# Patient Record
Sex: FEMALE | NOT HISPANIC OR LATINO | URBAN - METROPOLITAN AREA
[De-identification: names, ages, dates, MRNs, and addresses within clinical notes are randomized per-mention and may not be internally consistent; named-entity substitution may affect disease eponyms.]

---

## 2018-01-21 ENCOUNTER — EMERGENCY (EMERGENCY)
Facility: HOSPITAL | Age: 23
LOS: 1 days | Discharge: HOME | End: 2018-01-21
Attending: EMERGENCY MEDICINE | Admitting: EMERGENCY MEDICINE
Payer: COMMERCIAL

## 2018-01-21 VITALS
DIASTOLIC BLOOD PRESSURE: 87 MMHG | HEART RATE: 99 BPM | OXYGEN SATURATION: 100 % | RESPIRATION RATE: 16 BRPM | SYSTOLIC BLOOD PRESSURE: 127 MMHG | TEMPERATURE: 98 F

## 2018-01-21 PROCEDURE — 99283 EMERGENCY DEPT VISIT LOW MDM: CPT

## 2018-01-21 PROCEDURE — 71046 X-RAY EXAM CHEST 2 VIEWS: CPT | Mod: 26

## 2018-01-21 RX ORDER — ALBUTEROL 90 UG/1
2 AEROSOL, METERED ORAL
Qty: 0.5 | Refills: 0 | OUTPATIENT
Start: 2018-01-21 | End: 2018-02-19

## 2018-01-21 RX ORDER — ALBUTEROL 90 UG/1
2.5 AEROSOL, METERED ORAL ONCE
Qty: 0 | Refills: 0 | Status: COMPLETED | OUTPATIENT
Start: 2018-01-21 | End: 2018-01-21

## 2018-01-21 RX ADMIN — ALBUTEROL 2.5 MILLIGRAM(S): 90 AEROSOL, METERED ORAL at 23:35

## 2018-01-21 NOTE — ED PROVIDER NOTE - CONSTITUTIONAL, MLM
normal... Well appearing, well nourished, awake, alert, oriented to person, place, time/situation and in no apparent distress. pt is comfortable, and speaking in full sentences.

## 2018-01-21 NOTE — ED PROVIDER NOTE - MEDICAL DECISION MAKING DETAILS
21 y/o F p/w intermittent SOB x2days w/ associated nasal congestion, +sick contacts at home, concerning for viral syndrome, pt is perc negative. 21 y/o F p/w intermittent SOB x2days w/ associated nasal congestion, +sick contacts at home, concerning for viral syndrome, pt is perc negative.  -xr, supportive tx

## 2018-01-21 NOTE — ED ADULT NURSE NOTE - OBJECTIVE STATEMENT
pt returned to intake 13 ambulating back to room from xray. respirations even and unlabored. breath sounds present bilaterally clear, good aeration no audible wheezing. pt c.o. chest tightness and sob. neb given as ordered.

## 2018-01-21 NOTE — ED ADULT TRIAGE NOTE - CHIEF COMPLAINT QUOTE
c.o shortness of breath and dizziness since friday. denies chest pain. able to speak full sentences in triage. denies pmh

## 2018-01-21 NOTE — ED PROVIDER NOTE - OBJECTIVE STATEMENT
23 y/o F w/ no PMHx p/w SOB. Notes feeling intermittent dyspnea x2days, lasting minutes to hrs at a time. Random onset. Associated w/ nasal congestion. Pt's parents are sick at home w/ URI-like sx. Pt denies fever, cough, sore throat, CP, recent travel, OCP use, h/o DVT. 23 y/o F w/ no PMHx p/w SOB. Notes feeling intermittent dyspnea x2days, lasting minutes to hrs at a time. Random onset. Associated w/ nasal congestion. Pt's parents are sick at home w/ URI-like sx. Pt denies fever, cough, sore throat, CP, recent travel, OCP use, leg swelling, h/o DVT.

## 2018-01-21 NOTE — ED PROVIDER NOTE - ATTENDING CONTRIBUTION TO CARE
23 y/o F with hx seasonal allergies and anxiety here for intermittent sob x 2 days a/w nasal congestion, +sick contacts at home, concerning for viral syndrome, pt is perc negative. No f/c, cough, cp, palpitations, n/v, abdominal pain, leg swelling. Pt just had her IUD removed. Denies recent travel, recent surgery.  -xr, nebs for symptomatic relief and d/c if improved and chest xray is neg for acute process  I performed a history and physical exam of the patient and discussed their management with the resident. I reviewed the resident's note and agree with the documented findings and plan of care. My medical decision making and observations are found above.

## 2019-02-16 ENCOUNTER — EMERGENCY (EMERGENCY)
Facility: HOSPITAL | Age: 24
LOS: 1 days | Discharge: HOME | End: 2019-02-16
Attending: EMERGENCY MEDICINE | Admitting: EMERGENCY MEDICINE
Payer: COMMERCIAL

## 2019-02-16 VITALS
OXYGEN SATURATION: 98 % | SYSTOLIC BLOOD PRESSURE: 106 MMHG | HEART RATE: 63 BPM | DIASTOLIC BLOOD PRESSURE: 67 MMHG | TEMPERATURE: 98 F | RESPIRATION RATE: 18 BRPM

## 2019-02-16 PROCEDURE — 99283 EMERGENCY DEPT VISIT LOW MDM: CPT

## 2019-02-16 NOTE — PROGRESS NOTE ADULT - SUBJECTIVE AND OBJECTIVE BOX
HPI:  Pt presents with pain to head and swelling after extraction of left side upper and lower wisdom teeth. Pt had extractions completed 10 days ago and has completed antibiotic regimen.       Allergies    No Known Allergies      Vital Signs Last 24 Hrs  T(C): 36.6 (16 Feb 2019 19:10), Max: 36.6 (16 Feb 2019 19:10)  T(F): 97.9 (16 Feb 2019 19:10), Max: 97.9 (16 Feb 2019 19:10)  HR: 63 (16 Feb 2019 19:10) (63 - 63)  BP: 106/67 (16 Feb 2019 19:10) (106/67 - 106/67)  BP(mean): --  RR: 18 (16 Feb 2019 19:10) (18 - 18)  SpO2: 98% (16 Feb 2019 19:10) (98% - 98%)    CLINICAL EXAM: EOE: No LAD, TMD, swelling, asymetry   IOE: No oral lesions found on lateral border of tongue, soft/hard palate, uvula. Mild purulent swelling around site of tooth #17. Pus appreciated. Socket appears to be closed.     DENTAL RADIOGRAPHS: PAN taken and reviewed.     RADIOLOGY & ADDITIONAL TESTS: Site of tooth #16 and #17 extractions appear normal.     ASSESSMENT: 23 year old presents with mild abscess in site of tooth #17 post extraction.   PLAN: Incision and drainage.     PROCEDURE:  Verbal and written consent given.  Anesthesia: 2 carpules of 2 % Lidocaine 1: 100K epi administered as left RADHA block. 1 carpule of 4 % Septocaine administered as buccal infiltration.   Procedure: Site incised to bone with 15 blade. Blunt dissection performed. Site copiously irrigated with saline. 1 iodoform gauze placed and sutured with 1 black silk suture. POIG    RECOMMENDATIONS:  1) Follow up with patients outpatient dentist or [facility] in 48 hour for drain removal.   2) Soft diet, OTC pain meds  3) F/U with outpatient dentist for comprehensive dental care upon discharge.  4) If swelling, fever, difficulty breathing/swallowing occurs, page Dental.     Reyes Ball, Pager #[phone]

## 2019-02-16 NOTE — ED PROVIDER NOTE - PROGRESS NOTE DETAILS
Randy att: Patient not seen in ED, not seen by provider x 2, call placed topreferred phone and discussed dc instructions on phone.

## 2019-02-16 NOTE — ED PROVIDER NOTE - CARE PLAN
Prior Authorization fax to [facility]. Patient notified that the Adderall is not considered an emergency drug for refill. He will have to try and fail two approved stimulant medications for 60 days each before the consideration of a non approved stimulant. Patient notified of this information.  
Principal Discharge DX:	Dental abscess

## 2019-02-16 NOTE — ED PROVIDER NOTE - OBJECTIVE STATEMENT
19:52 Randy att: 23F s/p   Amoxicillin 6 days  Percocet 7.5/325 q4h   Ibuprofen     2d More pain in jaw and left ear pain and headache   x fever facial swelling     PMH x PSH tooth extraction MED as above ALL x SMOKE x PCP PHARMACY [phone] 19:52 Randy att: 23F POD#10 2 wisdom tooth extractions p/w incr headache, ear pain and jaw pain x 2 days. Ten days ago patient had upper left and lower left molar removed. Took amoxicillin bid x 6 days as prescribed. Past 2 days notes increased left sided headache, left ear pain, and left jaw pain despite ibuprofen and Percocet 7.5/325 q4h. Denies fever or facial swelling. PMH x PSH tooth extraction MED as above ALL x SMOKE x PCP PHARMACY [facility] [phone] [place] [address] [place]

## 2019-02-16 NOTE — ED PROVIDER NOTE - NSFOLLOWUPINSTRUCTIONS_ED_ALL_ED_FT
Seen in ER for dental abscess after wisdom tooth extraction. [facility] drained the abscess. Please take Clindamycin 300 mg every 8 hours x 7 days to clear the infection. Continue your pain meds as prescribed, the pain will subside now that the area has been drained. Follow-up with your dentist. Return to ER for persistent fever, persistent pain, or increased facial swelling.

## 2019-09-10 ENCOUNTER — EMERGENCY (EMERGENCY)
Facility: HOSPITAL | Age: 24
LOS: 1 days | Discharge: HOME | End: 2019-09-10
Attending: STUDENT IN AN ORGANIZED HEALTH CARE EDUCATION/TRAINING PROGRAM | Admitting: STUDENT IN AN ORGANIZED HEALTH CARE EDUCATION/TRAINING PROGRAM
Payer: COMMERCIAL

## 2019-09-10 VITALS
HEART RATE: 78 BPM | SYSTOLIC BLOOD PRESSURE: 104 MMHG | OXYGEN SATURATION: 99 % | DIASTOLIC BLOOD PRESSURE: 65 MMHG | RESPIRATION RATE: 16 BRPM | TEMPERATURE: 98 F

## 2019-09-10 VITALS
OXYGEN SATURATION: 99 % | DIASTOLIC BLOOD PRESSURE: 76 MMHG | RESPIRATION RATE: 16 BRPM | HEIGHT: 63 IN | TEMPERATURE: 98 F | SYSTOLIC BLOOD PRESSURE: 109 MMHG | WEIGHT: 175.05 LBS | HEART RATE: 82 BPM

## 2019-09-10 LAB
ALBUMIN SERPL ELPH-MCNC: 4.2 G/DL — SIGNIFICANT CHANGE UP (ref 3.3–5)
ALP SERPL-CCNC: 67 U/L — SIGNIFICANT CHANGE UP (ref 40–120)
ALT FLD-CCNC: 12 U/L — SIGNIFICANT CHANGE UP (ref 4–33)
ANION GAP SERPL CALC-SCNC: 13 MMO/L — SIGNIFICANT CHANGE UP (ref 7–14)
APPEARANCE UR: CLEAR — SIGNIFICANT CHANGE UP
APTT BLD: 27 SEC — LOW (ref 27.5–36.3)
AST SERPL-CCNC: 10 U/L — SIGNIFICANT CHANGE UP (ref 4–32)
BACTERIA # UR AUTO: SIGNIFICANT CHANGE UP
BILIRUB SERPL-MCNC: 0.3 MG/DL — SIGNIFICANT CHANGE UP (ref 0.2–1.2)
BILIRUB UR-MCNC: NEGATIVE — SIGNIFICANT CHANGE UP
BLOOD UR QL VISUAL: HIGH
BUN SERPL-MCNC: 9 MG/DL — SIGNIFICANT CHANGE UP (ref 7–23)
CALCIUM SERPL-MCNC: 8.9 MG/DL — SIGNIFICANT CHANGE UP (ref 8.4–10.5)
CHLORIDE SERPL-SCNC: 102 MMOL/L — SIGNIFICANT CHANGE UP (ref 98–107)
CO2 SERPL-SCNC: 23 MMOL/L — SIGNIFICANT CHANGE UP (ref 22–31)
COLOR SPEC: SIGNIFICANT CHANGE UP
CREAT SERPL-MCNC: 0.64 MG/DL — SIGNIFICANT CHANGE UP (ref 0.5–1.3)
GLUCOSE SERPL-MCNC: 95 MG/DL — SIGNIFICANT CHANGE UP (ref 70–99)
GLUCOSE UR-MCNC: NEGATIVE — SIGNIFICANT CHANGE UP
HCG UR-SCNC: NEGATIVE — SIGNIFICANT CHANGE UP
HCT VFR BLD CALC: 36.3 % — SIGNIFICANT CHANGE UP (ref 34.5–45)
HGB BLD-MCNC: 11.7 G/DL — SIGNIFICANT CHANGE UP (ref 11.5–15.5)
HYALINE CASTS # UR AUTO: NEGATIVE — SIGNIFICANT CHANGE UP
INR BLD: 1.12 — SIGNIFICANT CHANGE UP (ref 0.88–1.17)
KETONES UR-MCNC: NEGATIVE — SIGNIFICANT CHANGE UP
LEUKOCYTE ESTERASE UR-ACNC: SIGNIFICANT CHANGE UP
MCHC RBC-ENTMCNC: 28.3 PG — SIGNIFICANT CHANGE UP (ref 27–34)
MCHC RBC-ENTMCNC: 32.2 % — SIGNIFICANT CHANGE UP (ref 32–36)
MCV RBC AUTO: 87.9 FL — SIGNIFICANT CHANGE UP (ref 80–100)
NITRITE UR-MCNC: NEGATIVE — SIGNIFICANT CHANGE UP
NRBC # FLD: 0 K/UL — SIGNIFICANT CHANGE UP (ref 0–0)
PH UR: 7 — SIGNIFICANT CHANGE UP (ref 5–8)
PLATELET # BLD AUTO: 297 K/UL — SIGNIFICANT CHANGE UP (ref 150–400)
PMV BLD: 9.8 FL — SIGNIFICANT CHANGE UP (ref 7–13)
POTASSIUM SERPL-MCNC: 4 MMOL/L — SIGNIFICANT CHANGE UP (ref 3.5–5.3)
POTASSIUM SERPL-SCNC: 4 MMOL/L — SIGNIFICANT CHANGE UP (ref 3.5–5.3)
PROT SERPL-MCNC: 7.6 G/DL — SIGNIFICANT CHANGE UP (ref 6–8.3)
PROT UR-MCNC: 20 — SIGNIFICANT CHANGE UP
PROTHROM AB SERPL-ACNC: 12.5 SEC — SIGNIFICANT CHANGE UP (ref 9.8–13.1)
RBC # BLD: 4.13 M/UL — SIGNIFICANT CHANGE UP (ref 3.8–5.2)
RBC # FLD: 12.9 % — SIGNIFICANT CHANGE UP (ref 10.3–14.5)
RBC CASTS # UR COMP ASSIST: >50 — HIGH (ref 0–?)
SODIUM SERPL-SCNC: 138 MMOL/L — SIGNIFICANT CHANGE UP (ref 135–145)
SP GR SPEC: 1.01 — SIGNIFICANT CHANGE UP (ref 1–1.04)
SQUAMOUS # UR AUTO: SIGNIFICANT CHANGE UP
UROBILINOGEN FLD QL: NORMAL — SIGNIFICANT CHANGE UP
WBC # BLD: 10.94 K/UL — HIGH (ref 3.8–10.5)
WBC # FLD AUTO: 10.94 K/UL — HIGH (ref 3.8–10.5)
WBC UR QL: HIGH (ref 0–?)

## 2019-09-10 PROCEDURE — 76830 TRANSVAGINAL US NON-OB: CPT | Mod: 26

## 2019-09-10 PROCEDURE — 99284 EMERGENCY DEPT VISIT MOD MDM: CPT

## 2019-09-10 RX ORDER — SODIUM CHLORIDE 9 MG/ML
1000 INJECTION INTRAMUSCULAR; INTRAVENOUS; SUBCUTANEOUS ONCE
Refills: 0 | Status: COMPLETED | OUTPATIENT
Start: 2019-09-10 | End: 2019-09-10

## 2019-09-10 RX ADMIN — SODIUM CHLORIDE 1000 MILLILITER(S): 9 INJECTION INTRAMUSCULAR; INTRAVENOUS; SUBCUTANEOUS at 20:18

## 2019-09-10 NOTE — ED PROVIDER NOTE - OBJECTIVE STATEMENT
24 y/o female patient is presenting to the ED c/o abdominal cramping and vaginal bleeding. Patient states her period began on Sunday and has progressively become more painful and heavy with lower abdominal pain. She reports severe pain last night that interfered with her ability to urinate. She was seen by urgent care today and recommended to come into the ED for U/S and bloodwork. She is sexually active without birth control and had an elective miscarriage in the past. She denies any medical problems. She had her annual pelvic last week which was normal. No nausea, vomiting, diarrhea, dysuria, hematuria, fever, chills. 24 y/o female patient is presenting to the ED c/o abdominal cramping and vaginal bleeding. Patient states her period began on  and has progressively become more painful and heavy with lower abdominal pain. She reports severe pain last night that interfered with her ability to urinate. She was seen by urgent care today and recommended to come into the ED for U/S and bloodwork. She is sexually active without birth control and had an elective  w/ methotrexate in the past. She denies any medical problems. She had her annual pelvic last week which was normal. No nausea, vomiting, diarrhea, dysuria, hematuria, fever, chills.

## 2019-09-10 NOTE — ED PROVIDER NOTE - PATIENT PORTAL LINK FT
You can access the FollowMyHealth Patient Portal offered by [facility] by registering at the following website: [email or web address]. By joining [facility]’s FollowMyHealth portal, you will also be able to view your health information using other applications (apps) compatible with our system.

## 2019-09-10 NOTE — ED PROVIDER NOTE - ATTENDING CONTRIBUTION TO CARE
22 yo female presents to ED for evalaution of abd craming and vaginal bleeding. Denies nausea, vomiting. Denies urinary symptoms including dysuria, increased urinary frequency, hematuria. Denies fevers, chills, nausea, vomiting, flank pain.   Gen: no acute distress, well appearing, awake, alert and oriented x 3  Cardiac: regular rate and rhythm, +S1S2  Pulm: Clear to auscultation bilaterally  Abd: soft, +lower abd ttp, nondistended, no guarding  MDM - Will check labs, imaging, medicate, reassess

## 2019-09-10 NOTE — ED PROVIDER NOTE - CLINICAL SUMMARY MEDICAL DECISION MAKING FREE TEXT BOX
24 y/o female patient with no PMH presents with vaginal bleeding and cramping x  2 days. Will order labs and transvaginal U/S r/o ovarian torsion or ruptured cyst.

## 2019-09-10 NOTE — ED ADULT TRIAGE NOTE - CHIEF COMPLAINT QUOTE
Pt c/o abdominal pain has her period now pt went to urgent care and was sent here for further eval   PMH: ryan

## 2019-09-10 NOTE — ED ADULT NURSE NOTE - OBJECTIVE STATEMENT
Received pt in intake 3, ambulatory, pt A&Ox4, respirations even and unlabored b/l. Pt c/o abd pain. Reports is currently on menstrual cycle. Went to Urgent Care and was sent to ED for further evaluation. IVL 20g Angiocath placed on left AC. Labs sent. Urine sent. Awaiting TVUS. Will continue to monitor.

## 2019-09-10 NOTE — ED PROVIDER NOTE - NS ED ROS FT
CONST: no fevers, no chills  EYES: no pain, no vision changes  ENT: no sore throat, no ear pain, no change in hearing  CV: no chest pain, no leg swelling  RESP: no shortness of breath, no cough  ABD: + abdominal pain, no nausea, no vomiting, no diarrhea  : no dysuria, no flank pain, no hematuria  MSK: no back pain, no extremity pain  NEURO: no headache or additional neurologic complaints  SKIN:  no rash

## 2019-09-10 NOTE — ED PROVIDER NOTE - PHYSICAL EXAMINATION
GENERAL: Patient awake alert NAD.  HEENT: NC/AT, Moist mucous membranes, PERRL, EOMI.  LUNGS: CTAB, no wheezes or crackles.   CARDIAC: RRR, no m/r/g.    ABDOMEN: Soft, mild-moderate LLQ tenderness to palpation, ND, No rebound, guarding. No CVA tenderness.   PELVIC: os open with blood, no discharge, right sided adnexal tenderness on palpation, no cervical motion tenderness  NEURO: A&Ox3. Moving all extremities.  SKIN: Warm and dry. No rash.  PSYCH: Normal affect.

## 2021-05-26 NOTE — ED PROVIDER NOTE - CAS EDP CONSULT WILL SEE PT
Colon/egd scheduled 07/292/1 @[facility] with Dr Mohini Dick    Miralax/dulcolax instructions given by Urban Soulier in the office shortly

## 2022-05-06 ENCOUNTER — APPOINTMENT (OUTPATIENT)
Dept: PULMONOLOGY | Facility: CLINIC | Age: 27
End: 2022-05-06
Payer: COMMERCIAL

## 2022-05-06 ENCOUNTER — NON-APPOINTMENT (OUTPATIENT)
Age: 27
End: 2022-05-06

## 2022-05-06 VITALS
BODY MASS INDEX: 31.01 KG/M2 | WEIGHT: 175 LBS | TEMPERATURE: 98.7 F | SYSTOLIC BLOOD PRESSURE: 111 MMHG | OXYGEN SATURATION: 96 % | DIASTOLIC BLOOD PRESSURE: 79 MMHG | HEART RATE: 80 BPM | HEIGHT: 63 IN

## 2022-05-06 DIAGNOSIS — J30.2 OTHER SEASONAL ALLERGIC RHINITIS: ICD-10-CM

## 2022-05-06 DIAGNOSIS — Z80.8 FAMILY HISTORY OF MALIGNANT NEOPLASM OF OTHER ORGANS OR SYSTEMS: ICD-10-CM

## 2022-05-06 DIAGNOSIS — Z82.69 FAMILY HISTORY OF OTHER DISEASES OF THE MUSCULOSKELETAL SYSTEM AND CONNECTIVE TISSUE: ICD-10-CM

## 2022-05-06 DIAGNOSIS — Z78.9 OTHER SPECIFIED HEALTH STATUS: ICD-10-CM

## 2022-05-06 DIAGNOSIS — R06.2 WHEEZING: ICD-10-CM

## 2022-05-06 DIAGNOSIS — Z82.49 FAMILY HISTORY OF ISCHEMIC HEART DISEASE AND OTHER DISEASES OF THE CIRCULATORY SYSTEM: ICD-10-CM

## 2022-05-06 DIAGNOSIS — R05.2 SUBACUTE COUGH: ICD-10-CM

## 2022-05-06 DIAGNOSIS — Z83.438 FAMILY HISTORY OF OTHER DISORDER OF LIPOPROTEIN METABOLISM AND OTHER LIPIDEMIA: ICD-10-CM

## 2022-05-06 PROCEDURE — 94727 GAS DIL/WSHOT DETER LNG VOL: CPT

## 2022-05-06 PROCEDURE — 94729 DIFFUSING CAPACITY: CPT

## 2022-05-06 PROCEDURE — 94060 EVALUATION OF WHEEZING: CPT

## 2022-05-06 PROCEDURE — 99205 OFFICE O/P NEW HI 60 MIN: CPT | Mod: 25

## 2022-05-06 PROCEDURE — 71046 X-RAY EXAM CHEST 2 VIEWS: CPT

## 2022-05-06 PROCEDURE — 94618 PULMONARY STRESS TESTING: CPT

## 2022-05-06 RX ORDER — CETIRIZINE HYDROCHLORIDE 10 MG/1
10 TABLET, FILM COATED ORAL
Qty: 1 | Refills: 0 | Status: ACTIVE | COMMUNITY
Start: 2022-05-06

## 2022-05-06 NOTE — HISTORY OF PRESENT ILLNESS
[TextBox_4] : 26-year-old female\par Chief complaint COVID infection approximately 1 month ago\par Post patient received COVID Pfizer vaccine x2 doses without booster\par Denies prior history of pulmonary disease\par No history of asthma pneumonia bronchitis pulmonary Bolick disease\par Patient states she did have significant childhood allergies and eczema\par Other medical history fibromyalgia\par She is subsequently complaining of shortness of breath\par She has significant nocturnal symptoms even feel like she awakens gasping but does not describe being told of loud snoring\par She is complained of some chest tightness with associated occasional wheeze\par She has no reported active sputum production fevers chills or sweats\par With her initial COVID infection symptoms included at that time fever body aches cough chills sputum production\par She also feels that dairy products might trigger symptomatology\par Shortness of breath has been persistent since COVID\par She did not receive treatment monoclonal antibody\par

## 2022-05-06 NOTE — PHYSICAL EXAM
[No Acute Distress] : no acute distress [Low Lying Soft Palate] : low lying soft palate [III] : Mallampati Class: III [Normal Appearance] : normal appearance [Supple] : supple [No Neck Mass] : no neck mass [No JVD] : no jvd [Normal Rate/Rhythm] : normal rate/rhythm [Normal S1, S2] : normal s1, s2 [No Murmurs] : no murmurs [No Resp Distress] : no resp distress [No Acc Muscle Use] : no acc muscle use [Normal Palpation] : normal palpation [Normal Rhythm and Effort] : normal rhythm and effort [Clear to Auscultation Bilaterally] : clear to auscultation bilaterally [Normal to Percussion] : normal to percussion [No Abnormalities] : no abnormalities [Benign] : benign [Not Tender] : not tender [Soft] : soft [No HSM] : no hsm [Normal Bowel Sounds] : normal bowel sounds [Normal Gait] : normal gait [No Clubbing] : no clubbing [No Cyanosis] : no cyanosis [No Edema] : no edema [FROM] : FROM [Normal Color/ Pigmentation] : normal color/ pigmentation [No Focal Deficits] : no focal deficits [Oriented x3] : oriented x3 [Normal Affect] : normal affect

## 2022-05-06 NOTE — DISCUSSION/SUMMARY
[FreeTextEntry1] : COVID-19 infection\par Cough shortness of breath wheeze\par Small airways disease\par Abnormal PFT\par History childhood eczema\par Fibromyalgia\par Recommendations are\par Patient is seeing an allergist so held off on asthma panel Food IgE and serum IgE at present time\par Treatment protocol\par Short acting bronchodilator albuterol provided with testing done in office\par Asmanex 100 MCG 2 puffs twice daily with instructions to rinse\par MDI detailed instructions with visual instructions provided\par Singular 10 mg 1 tablet p.o. daily with food\par Office follow-up 1 month recheck spirometry and\par Request allergy data for completeness\par

## 2022-05-06 NOTE — REVIEW OF SYSTEMS
[Nasal Congestion] : nasal congestion [Sinus Problems] : sinus problems [Seasonal Allergies] : seasonal allergies [Negative] : Neurologic [Depression] : no depression [Anxiety] : no anxiety [Diabetes] : no diabetes [Thyroid Problem] : no thyroid problem [TextBox_30] : HPI [TextBox_104] : kemar Dowell

## 2022-05-06 NOTE — PROCEDURE
[FreeTextEntry1] : PFT May 6, 2022\par Variable loops\par Will interpret based on postbronchodilator response\par Mild obstructive ventilatory impairment\par Ratio 75 normal lung volumes\par Positive air trapping\par RV/TLC ratio 173% predicted\par Diffusion normal 80% of predicted\par Hemoglobin 12.4\par \par Pulmonary 6-minute walk test  5/6/22\par Baseline room air O2 saturation 98%\par Impression normal study on room air\par No evidence of exercise-induced exertional hypoxemia\par \par Chest x-ray PA lateral May 6, 2022\par Normal cardiac size\par Clear lung fields\par No parenchymal infiltrates pleural effusions dominant pulmonary nodules\par Soft tissue bony structures unremarkable\par No prior chest x-rays for review\par

## 2022-06-10 ENCOUNTER — APPOINTMENT (OUTPATIENT)
Dept: PULMONOLOGY | Facility: CLINIC | Age: 27
End: 2022-06-10
Payer: COMMERCIAL

## 2022-06-10 VITALS
BODY MASS INDEX: 31.01 KG/M2 | OXYGEN SATURATION: 99 % | WEIGHT: 175 LBS | SYSTOLIC BLOOD PRESSURE: 120 MMHG | HEART RATE: 81 BPM | HEIGHT: 63 IN | RESPIRATION RATE: 16 BRPM | DIASTOLIC BLOOD PRESSURE: 84 MMHG | TEMPERATURE: 98.5 F

## 2022-06-10 DIAGNOSIS — R06.02 SHORTNESS OF BREATH: ICD-10-CM

## 2022-06-10 LAB — POCT - HEMOGLOBIN (HGB), QUANTITATIVE, TRANSCUTANEOUS: 12.3

## 2022-06-10 PROCEDURE — 94729 DIFFUSING CAPACITY: CPT

## 2022-06-10 PROCEDURE — 99214 OFFICE O/P EST MOD 30 MIN: CPT | Mod: 25

## 2022-06-10 PROCEDURE — 88738 HGB QUANT TRANSCUTANEOUS: CPT

## 2022-06-10 PROCEDURE — ZZZZZ: CPT

## 2022-06-10 PROCEDURE — 94727 GAS DIL/WSHOT DETER LNG VOL: CPT

## 2022-06-10 PROCEDURE — 94010 BREATHING CAPACITY TEST: CPT

## 2022-06-10 NOTE — HISTORY OF PRESENT ILLNESS
[TextBox_4] : 26-year-old female\par Post evaluation treatment protocol Inhaled corticosteroids Singulair significant overall improvement of respiratory status with resolution of any significant cough chest congestion wheezing chest tightness chest congestion\par Chief complaint COVID infection approximately 1 month ago\par Post patient received COVID Pfizer vaccine x2 doses without booster\par Denies prior history of pulmonary disease\par No history of asthma pneumonia bronchitis pulmonary Bolick disease\par Patient states she did have significant childhood allergies and eczema\par Other medical history fibromyalgia\par She is subsequently complaining of shortness of breath\par She has significant nocturnal symptoms even feel like she awakens gasping but does not describe being told of loud snoring\par She is complained of some chest tightness with associated occasional wheeze\par She has no reported active sputum production fevers chills or sweats\par With her initial COVID infection symptoms included at that time fever body aches cough chills sputum production\par She also feels that dairy products might trigger symptomatology\par Shortness of breath has been persistent since COVID\par She did not receive treatment monoclonal antibody\par

## 2022-06-10 NOTE — DISCUSSION/SUMMARY
[FreeTextEntry1] : COVID-19 infection\par Cough shortness of breath wheeze resolution\par Small airways disease\par Abnormal PFT noted interval improvement at flow rates\par History childhood eczema\par Fibromyalgia\par Recommendations are\par Patient is seeing an allergist so held off on asthma panel Food IgE and serum IgE at present time\par Treatment protocol\par Short acting bronchodilator albuterol provided with testing done in office\par Asmanex 100 MCG 2 puffs twice daily with instructions to rinse continue\par MDI detailed instructions with visual instructions provided\par Singular 10 mg 1 tablet p.o. daily with food continue\par Office follow-up 1 month recheck spirometry \par Request allergy data for completeness\par

## 2022-06-10 NOTE — PROCEDURE
[FreeTextEntry1] : PFT Deysi 10, 2022\par Flow rates normal\par FEV1 88% predicted\par Lung vitals are normal\par Total capacity 91% of predicted\par Diffusion normal range 75% of predicted\par Hemoglobin 12.3\par Data comparison to May 6, 2022 demonstrates some decline at the lung capacities but most significant interval improvement at the FVC from 2.40 to 3.39 L\par FEV1 improved from 1.20 to 2.83 L consistent with interval improvement of the underlying small airways disease post COVID-19 infection\par \par PFT May 6, 2022\par Variable loops\par Will interpret based on postbronchodilator response\par Mild obstructive ventilatory impairment\par Ratio 75 normal lung volumes\par Positive air trapping\par RV/TLC ratio 173% predicted\par Diffusion normal 80% of predicted\par Hemoglobin 12.4\par \par Pulmonary 6-minute walk test  5/6/22\par Baseline room air O2 saturation 98%\par Impression normal study on room air\par No evidence of exercise-induced exertional hypoxemia\par \par Chest x-ray PA lateral May 6, 2022\par Normal cardiac size\par Clear lung fields\par No parenchymal infiltrates pleural effusions dominant pulmonary nodules\par Soft tissue bony structures unremarkable\par No prior chest x-rays for review\par

## 2022-06-10 NOTE — REVIEW OF SYSTEMS
[Nasal Congestion] : nasal congestion [Sinus Problems] : sinus problems [Seasonal Allergies] : seasonal allergies [Depression] : no depression [Anxiety] : no anxiety [Diabetes] : no diabetes [Thyroid Problem] : no thyroid problem [Negative] : Neurologic [TextBox_30] : HPI [TextBox_104] : kemar Dowell

## 2022-07-08 ENCOUNTER — APPOINTMENT (OUTPATIENT)
Dept: PULMONOLOGY | Facility: CLINIC | Age: 27
End: 2022-07-08

## 2022-07-08 VITALS
WEIGHT: 169 LBS | BODY MASS INDEX: 29.95 KG/M2 | HEIGHT: 63 IN | HEART RATE: 75 BPM | TEMPERATURE: 98.4 F | DIASTOLIC BLOOD PRESSURE: 72 MMHG | SYSTOLIC BLOOD PRESSURE: 103 MMHG | OXYGEN SATURATION: 96 %

## 2022-07-08 DIAGNOSIS — U07.1 COVID-19: ICD-10-CM

## 2022-07-08 DIAGNOSIS — R94.2 ABNORMAL RESULTS OF PULMONARY FUNCTION STUDIES: ICD-10-CM

## 2022-07-08 DIAGNOSIS — J98.4 OTHER DISORDERS OF LUNG: ICD-10-CM

## 2022-07-08 PROCEDURE — 94060 EVALUATION OF WHEEZING: CPT

## 2022-07-08 PROCEDURE — 99214 OFFICE O/P EST MOD 30 MIN: CPT | Mod: 25

## 2022-07-08 NOTE — REASON FOR VISIT
[Cough] : cough [Follow-Up] : a follow-up visit [TextBox_44] : KHOA RUSH, COVID 19 Infection, Small Airways Disease

## 2022-07-08 NOTE — DISCUSSION/SUMMARY
[FreeTextEntry1] : COVID-19 infection\par Cough shortness of breath wheeze resolution\par Small airways disease\par Abnormal PFT noted interval improvement at flow rates\par stable pulmonary physiology\par History childhood eczema\par Fibromyalgia\par Recommendations are\par Patient is seeing an allergist so held off on asthma panel Food IgE and serum IgE at present time\par Treatment protocol\par Short acting bronchodilator albuterol provided with testing done in office\par Asmanex 100 MCG 2 puffs twice daily with instructions to rinse continue\par MDI detailed instructions with visual instructions provided\par Singular 10 mg 1 tablet p.o. daily with food continue\par Office follow-up 1 month recheck spirometry \par Request allergy data for completeness\par

## 2022-07-08 NOTE — HISTORY OF PRESENT ILLNESS
[TextBox_4] : 26-year-old female\par post singulair asmanex with minimal use zyrtec and pos clinical benefit\par Post evaluation treatment protocol Inhaled corticosteroids Singulair significant overall improvement of respiratory status with resolution of any significant cough chest congestion wheezing chest tightness chest congestion\par Chief complaint COVID infection approximately 1 month ago\par Post patient received COVID Pfizer vaccine x2 doses without booster\par Denies prior history of pulmonary disease\par No history of asthma pneumonia bronchitis pulmonary Bolick disease\par Patient states she did have significant childhood allergies and eczema\par Other medical history fibromyalgia\par She is subsequently complaining of shortness of breath\par She has significant nocturnal symptoms even feel like she awakens gasping but does not describe being told of loud snoring\par She is complained of some chest tightness with associated occasional wheeze\par She has no reported active sputum production fevers chills or sweats\par With her initial COVID infection symptoms included at that time fever body aches cough chills sputum production\par She also feels that dairy products might trigger symptomatology\par Shortness of breath has been persistent since COVID\par She did not receive treatment monoclonal antibody\par

## 2022-07-08 NOTE — PROCEDURE
[FreeTextEntry1] : BALBIR 7/8/22\par  nl flows  stable \par \par PFT Deysi 10, 2022\par Flow rates normal\par FEV1 88% predicted\par Lung vitals are normal\par Total capacity 91% of predicted\par Diffusion normal range 75% of predicted\par Hemoglobin 12.3\par Data comparison to May 6, 2022 demonstrates some decline at the lung capacities but most significant interval improvement at the FVC from 2.40 to 3.39 L\par FEV1 improved from 1.20 to 2.83 L consistent with interval improvement of the underlying small airways disease post COVID-19 infection\par \par PFT May 6, 2022\par Variable loops\par Will interpret based on postbronchodilator response\par Mild obstructive ventilatory impairment\par Ratio 75 normal lung volumes\par Positive air trapping\par RV/TLC ratio 173% predicted\par Diffusion normal 80% of predicted\par Hemoglobin 12.4\par \par Pulmonary 6-minute walk test  5/6/22\par Baseline room air O2 saturation 98%\par Impression normal study on room air\par No evidence of exercise-induced exertional hypoxemia\par \par Chest x-ray PA lateral May 6, 2022\par Normal cardiac size\par Clear lung fields\par No parenchymal infiltrates pleural effusions dominant pulmonary nodules\par Soft tissue bony structures unremarkable\par No prior chest x-rays for review\par

## 2022-10-07 ENCOUNTER — APPOINTMENT (OUTPATIENT)
Dept: PULMONOLOGY | Facility: CLINIC | Age: 27
End: 2022-10-07

## 2022-10-07 ENCOUNTER — RX RENEWAL (OUTPATIENT)
Age: 27
End: 2022-10-07

## 2022-10-07 RX ORDER — MONTELUKAST 10 MG/1
10 TABLET, FILM COATED ORAL
Qty: 30 | Refills: 3 | Status: ACTIVE | COMMUNITY
Start: 2022-05-06 | End: 1900-01-01

## 2024-05-14 NOTE — ED ADULT TRIAGE NOTE - TEMPERATURE IN CELSIUS (DEGREES C)
Please take your Keppra as prescribed and follow-up with your neurologist for further evaluation.  Throw out your toothbrush and start fresh with a new toothbrush.  Increase your fluids to 8 ounces every hour that you are awake; include salty liquids such as soups, broths, seltzer water and Gatorade.   Alternate Motrin and Tylenol as needed for fever or body aches.  Throat culture pending.   36.4

## 2024-11-24 ENCOUNTER — OUTPATIENT (OUTPATIENT)
Dept: OUTPATIENT SERVICES | Facility: HOSPITAL | Age: 29
LOS: 1 days | End: 2024-11-24
Payer: COMMERCIAL

## 2024-11-24 DIAGNOSIS — Z00.8 ENCOUNTER FOR OTHER GENERAL EXAMINATION: ICD-10-CM

## 2024-11-24 PROCEDURE — 76882 US LMTD JT/FCL EVL NVASC XTR: CPT

## 2024-11-29 ENCOUNTER — APPOINTMENT (OUTPATIENT)
Dept: ULTRASOUND IMAGING | Facility: IMAGING CENTER | Age: 29
End: 2024-11-29

## 2024-11-29 ENCOUNTER — APPOINTMENT (OUTPATIENT)
Dept: MAMMOGRAPHY | Facility: IMAGING CENTER | Age: 29
End: 2024-11-29